# Patient Record
Sex: MALE | Race: WHITE | NOT HISPANIC OR LATINO | Employment: FULL TIME | URBAN - NONMETROPOLITAN AREA
[De-identification: names, ages, dates, MRNs, and addresses within clinical notes are randomized per-mention and may not be internally consistent; named-entity substitution may affect disease eponyms.]

---

## 2023-03-22 ENCOUNTER — APPOINTMENT (EMERGENCY)
Dept: CT IMAGING | Facility: HOSPITAL | Age: 38
End: 2023-03-22

## 2023-03-22 ENCOUNTER — HOSPITAL ENCOUNTER (EMERGENCY)
Facility: HOSPITAL | Age: 38
Discharge: HOME | End: 2023-03-22
Attending: EMERGENCY MEDICINE

## 2023-03-22 VITALS
BODY MASS INDEX: 30.73 KG/M2 | OXYGEN SATURATION: 98 % | TEMPERATURE: 97.9 F | HEART RATE: 88 BPM | WEIGHT: 195.77 LBS | DIASTOLIC BLOOD PRESSURE: 82 MMHG | RESPIRATION RATE: 17 BRPM | SYSTOLIC BLOOD PRESSURE: 132 MMHG | HEIGHT: 67 IN

## 2023-03-22 DIAGNOSIS — N13.30 HYDRONEPHROSIS, LEFT: Primary | ICD-10-CM

## 2023-03-22 DIAGNOSIS — R10.9 LEFT FLANK PAIN: Primary | ICD-10-CM

## 2023-03-22 DIAGNOSIS — N20.1 URETEROLITHIASIS: ICD-10-CM

## 2023-03-22 DIAGNOSIS — N20.1 URETERAL CALCULUS, LEFT: ICD-10-CM

## 2023-03-22 LAB
ANION GAP SERPL CALCULATED.3IONS-SCNC: 7 MMOL/L (ref 4–13)
BACTERIA UR QL AUTO: NORMAL /HPF
BASOPHILS # BLD AUTO: 0.07 THOUSANDS/ÂΜL (ref 0–0.1)
BASOPHILS NFR BLD AUTO: 1 % (ref 0–1)
BILIRUB UR QL STRIP: NEGATIVE
BUN SERPL-MCNC: 14 MG/DL (ref 5–25)
CALCIUM SERPL-MCNC: 10 MG/DL (ref 8.4–10.2)
CHLORIDE SERPL-SCNC: 100 MMOL/L (ref 96–108)
CLARITY UR: CLEAR
CO2 SERPL-SCNC: 28 MMOL/L (ref 21–32)
COLOR UR: YELLOW
CREAT SERPL-MCNC: 1.4 MG/DL (ref 0.6–1.3)
EOSINOPHIL # BLD AUTO: 0.05 THOUSAND/ÂΜL (ref 0–0.61)
EOSINOPHIL NFR BLD AUTO: 0 % (ref 0–6)
ERYTHROCYTE [DISTWIDTH] IN BLOOD BY AUTOMATED COUNT: 12 % (ref 11.6–15.1)
GFR SERPL CREATININE-BSD FRML MDRD: 64 ML/MIN/1.73SQ M
GLUCOSE SERPL-MCNC: 99 MG/DL (ref 65–140)
GLUCOSE UR STRIP-MCNC: NEGATIVE MG/DL
HCT VFR BLD AUTO: 41.8 % (ref 36.5–49.3)
HGB BLD-MCNC: 14.4 G/DL (ref 12–17)
HGB UR QL STRIP.AUTO: ABNORMAL
IMM GRANULOCYTES # BLD AUTO: 0.08 THOUSAND/UL (ref 0–0.2)
IMM GRANULOCYTES NFR BLD AUTO: 1 % (ref 0–2)
KETONES UR STRIP-MCNC: NEGATIVE MG/DL
LEUKOCYTE ESTERASE UR QL STRIP: NEGATIVE
LYMPHOCYTES # BLD AUTO: 1.92 THOUSANDS/ÂΜL (ref 0.6–4.47)
LYMPHOCYTES NFR BLD AUTO: 16 % (ref 14–44)
MCH RBC QN AUTO: 28.9 PG (ref 26.8–34.3)
MCHC RBC AUTO-ENTMCNC: 34.4 G/DL (ref 31.4–37.4)
MCV RBC AUTO: 84 FL (ref 82–98)
MONOCYTES # BLD AUTO: 1.05 THOUSAND/ÂΜL (ref 0.17–1.22)
MONOCYTES NFR BLD AUTO: 9 % (ref 4–12)
NEUTROPHILS # BLD AUTO: 8.99 THOUSANDS/ÂΜL (ref 1.85–7.62)
NEUTS SEG NFR BLD AUTO: 73 % (ref 43–75)
NITRITE UR QL STRIP: NEGATIVE
NON-SQ EPI CELLS URNS QL MICRO: NORMAL /HPF
NRBC BLD AUTO-RTO: 0 /100 WBCS
PH UR STRIP.AUTO: 6.5 [PH]
PLATELET # BLD AUTO: 287 THOUSANDS/UL (ref 149–390)
PMV BLD AUTO: 8.5 FL (ref 8.9–12.7)
POTASSIUM SERPL-SCNC: 4.2 MMOL/L (ref 3.5–5.3)
PROT UR STRIP-MCNC: NEGATIVE MG/DL
RBC # BLD AUTO: 4.98 MILLION/UL (ref 3.88–5.62)
RBC #/AREA URNS AUTO: NORMAL /HPF
SODIUM SERPL-SCNC: 135 MMOL/L (ref 135–147)
SP GR UR STRIP.AUTO: 1.01 (ref 1–1.03)
UROBILINOGEN UR QL STRIP.AUTO: 0.2 E.U./DL
WBC # BLD AUTO: 12.16 THOUSAND/UL (ref 4.31–10.16)
WBC #/AREA URNS AUTO: NORMAL /HPF

## 2023-03-22 RX ORDER — ESOMEPRAZOLE MAGNESIUM 40 MG/1
1 CAPSULE, DELAYED RELEASE ORAL
COMMUNITY
Start: 2022-10-04

## 2023-03-22 RX ORDER — ATOGEPANT 60 MG/1
60 TABLET ORAL DAILY
COMMUNITY
Start: 2022-10-03

## 2023-03-22 RX ORDER — AMITRIPTYLINE HYDROCHLORIDE 25 MG/1
25 TABLET, FILM COATED ORAL DAILY
COMMUNITY
Start: 2023-03-13

## 2023-03-22 RX ORDER — CEPHALEXIN 500 MG/1
500 CAPSULE ORAL EVERY 8 HOURS SCHEDULED
Qty: 21 CAPSULE | Refills: 0 | Status: SHIPPED | OUTPATIENT
Start: 2023-03-22 | End: 2023-03-29

## 2023-03-22 RX ORDER — OXYCODONE HYDROCHLORIDE AND ACETAMINOPHEN 5; 325 MG/1; MG/1
1 TABLET ORAL EVERY 8 HOURS PRN
Qty: 15 TABLET | Refills: 0 | Status: SHIPPED | OUTPATIENT
Start: 2023-03-22

## 2023-03-22 RX ORDER — ONDANSETRON 2 MG/ML
4 INJECTION INTRAMUSCULAR; INTRAVENOUS ONCE
Status: COMPLETED | OUTPATIENT
Start: 2023-03-22 | End: 2023-03-22

## 2023-03-22 RX ORDER — LEVOCETIRIZINE DIHYDROCHLORIDE 5 MG/1
1 TABLET, FILM COATED ORAL EVERY EVENING
COMMUNITY
Start: 2022-12-09

## 2023-03-22 RX ORDER — ONDANSETRON 4 MG/1
4 TABLET, ORALLY DISINTEGRATING ORAL EVERY 8 HOURS PRN
Qty: 20 TABLET | Refills: 0 | Status: SHIPPED | OUTPATIENT
Start: 2023-03-22

## 2023-03-22 RX ORDER — NAPROXEN 500 MG/1
500 TABLET ORAL 2 TIMES DAILY PRN
Qty: 30 TABLET | Refills: 0 | Status: SHIPPED | OUTPATIENT
Start: 2023-03-22

## 2023-03-22 RX ORDER — ATORVASTATIN CALCIUM 20 MG/1
1 TABLET, FILM COATED ORAL DAILY
COMMUNITY
Start: 2022-12-09

## 2023-03-22 RX ORDER — KETOROLAC TROMETHAMINE 30 MG/ML
15 INJECTION, SOLUTION INTRAMUSCULAR; INTRAVENOUS ONCE
Status: COMPLETED | OUTPATIENT
Start: 2023-03-22 | End: 2023-03-22

## 2023-03-22 RX ADMIN — KETOROLAC TROMETHAMINE 15 MG: 30 INJECTION, SOLUTION INTRAMUSCULAR at 09:44

## 2023-03-22 RX ADMIN — ONDANSETRON 4 MG: 2 INJECTION INTRAMUSCULAR; INTRAVENOUS at 09:43

## 2023-03-22 NOTE — DISCHARGE INSTRUCTIONS
Take medications as prescribed  Return with any worsening symptoms  You will be contacted by urology to schedule further treatment  You may call them if you do not hear from them by this afternoon  Thank you for choosing the emergency department at [facility]  We appreciated the opportunity and privilege to address your healthcare needs  We remain available to you should you require additional evaluation or assistance  We value your feedback and would appreciate the opportunity to address anything you identified as an opportunity to improve or where we excelled  If there are colleagues who deserve special recognition, please let us know! We hope you are feeling better soon! Please also note that sometimes there are subtle abnormalities in your lab values that you may observe when you access your record online  These are frequently not worrisome and if they are of concern we will have discussed them with you  However, we always encourage that you discuss any concerns you may have or observe on your record with your primary care provider  Please also be aware that voice transcription will occasionally recognize words or grammar differently than what was spoken

## 2023-03-22 NOTE — ED PROVIDER NOTES
History  Chief Complaint   Patient presents with   • Flank Pain     Pt c/o left flank pain radiating to abdomen w/nausea, vomiting and testicle pain starting last night  Hx kidney stones  Took motrin with some relief  Denies travel/sob/fevers/cough/d     73-year-old male with a history of kidney stones presenting to the emergency room with chief complaint of left-sided flank pain with radiation to his left lower quadrant and radiation to his testicle  Began last evening  Initially improved and then became worse again  Has become associate with nausea vomiting  At this point patient reports that he is feeling somewhat improved again  No fevers or chills  No dysuria  No hematuria  History provided by:  Patient and friend (Girlfriend provides portions of history )  Flank Pain  Pain location:  L flank  Pain quality: sharp and shooting    Pain radiates to:  LLQ and scrotum  Pain severity:  Moderate  Onset quality:  Sudden  Timing:  Intermittent  Progression:  Waxing and waning  Context: awakening from sleep    Relieved by:  None tried  Worsened by:  Palpation  Ineffective treatments:  None tried  Associated symptoms: nausea and vomiting    Associated symptoms: no chest pain, no dysuria, no fever, no hematuria and no shortness of breath        Prior to Admission Medications   Prescriptions Last Dose Informant Patient Reported? Taking?    Atogepant (Qulipta) 60 MG TABS 3/22/2023  Yes Yes   Sig: Take 60 mg by mouth daily   amitriptyline (ELAVIL) 25 mg tablet 3/21/2023  Yes Yes   Sig: Take 25 mg by mouth daily   atorvastatin (LIPITOR) 20 mg tablet 3/21/2023  Yes Yes   Sig: Take 1 tablet by mouth daily   esomeprazole (NexIUM) 40 MG capsule 3/22/2023  Yes Yes   Sig: Take 1 capsule by mouth daily before breakfast   levocetirizine (XYZAL) 5 MG tablet 3/21/2023  Yes Yes   Sig: Take 1 tablet by mouth every evening   propranolol (INNOPRAN XL) 120 MG 24 hr capsule 3/21/2023  Yes Yes   Sig: Take 120 mg by mouth daily Facility-Administered Medications: None       History reviewed  No pertinent past medical history  History reviewed  No pertinent surgical history  History reviewed  No pertinent family history  I have reviewed and agree with the history as documented  E-Cigarette/Vaping   • E-Cigarette Use Never User      E-Cigarette/Vaping Substances     Social History     Tobacco Use   • Smoking status: Never     Passive exposure: Never   • Smokeless tobacco: Never   Vaping Use   • Vaping Use: Never used   Substance Use Topics   • Alcohol use: Never   • Drug use: Never       Review of Systems   Constitutional: Negative  Negative for fever  HENT: Negative  Respiratory: Negative  Negative for choking, chest tightness, shortness of breath and wheezing  Cardiovascular: Negative  Negative for chest pain and palpitations  Gastrointestinal: Positive for nausea and vomiting  Genitourinary: Positive for difficulty urinating, flank pain and testicular pain  Negative for dysuria, frequency, hematuria, penile discharge, penile pain, penile swelling, scrotal swelling and urgency  All other systems reviewed and are negative  Physical Exam  Physical Exam  Vitals and nursing note reviewed  Constitutional:       General: He is in acute distress  Appearance: Normal appearance  He is well-developed and normal weight  He is not ill-appearing or toxic-appearing  HENT:      Head: Normocephalic and atraumatic  Hair is normal       Jaw: No pain on movement  Right Ear: External ear normal       Left Ear: External ear normal       Nose: Nose normal  No congestion  Mouth/Throat:      Mouth: Mucous membranes are moist    Eyes:      General: Lids are normal  No scleral icterus  Extraocular Movements: Extraocular movements intact  Conjunctiva/sclera: Conjunctivae normal       Pupils: Pupils are equal, round, and reactive to light     Cardiovascular:      Rate and Rhythm: Normal rate and regular rhythm  Heart sounds: Normal heart sounds  No murmur heard  Pulmonary:      Effort: Pulmonary effort is normal  No respiratory distress  Breath sounds: Normal breath sounds  No decreased breath sounds, wheezing, rhonchi or rales  Abdominal:      General: Abdomen is flat  There is no distension  Palpations: Abdomen is soft  Abdomen is not rigid  Tenderness: There is no abdominal tenderness  There is left CVA tenderness  There is no guarding or rebound  Musculoskeletal:         General: Normal range of motion  Cervical back: Normal range of motion and neck supple  Skin:     General: Skin is warm and dry  Coloration: Skin is not pale  Neurological:      General: No focal deficit present  Mental Status: He is alert and oriented to person, place, and time  Mental status is at baseline  Psychiatric:         Attention and Perception: Attention normal          Mood and Affect: Mood normal          Speech: Speech normal          Behavior: Behavior normal          Thought Content:  Thought content normal          Judgment: Judgment normal          Vital Signs  ED Triage Vitals [03/22/23 0831]   Temperature Pulse Respirations Blood Pressure SpO2   97 9 °F (36 6 °C) 88 17 132/82 98 %      Temp Source Heart Rate Source Patient Position - Orthostatic VS BP Location FiO2 (%)   Temporal Monitor Lying Right arm --      Pain Score       4           Vitals:    03/22/23 0831   BP: 132/82   Pulse: 88   Patient Position - Orthostatic VS: Lying         Visual Acuity      ED Medications  Medications   ondansetron (ZOFRAN) injection 4 mg (4 mg Intravenous Given 3/22/23 0943)   ketorolac (TORADOL) injection 15 mg (15 mg Intravenous Given 3/22/23 0944)       Diagnostic Studies  Results Reviewed     Procedure Component Value Units Date/Time    Basic metabolic panel [[id number]]  (Abnormal) Collected: 03/22/23 0945    Lab Status: Final result Specimen: Blood from Arm, Left Updated: 03/22/23 1012 Sodium 135 mmol/L      Potassium 4 2 mmol/L      Chloride 100 mmol/L      CO2 28 mmol/L      ANION GAP 7 mmol/L      BUN 14 mg/dL      Creatinine 1 40 mg/dL      Glucose 99 mg/dL      Calcium 10 0 mg/dL      eGFR 64 ml/min/1 73sq m     Narrative:      [place] guidelines for Chronic Kidney Disease (CKD):   •  Stage 1 with normal or high GFR (GFR > 90 mL/min/1 73 square meters)  •  Stage 2 Mild CKD (GFR = 60-89 mL/min/1 73 square meters)  •  Stage 3A Moderate CKD (GFR = 45-59 mL/min/1 73 square meters)  •  Stage 3B Moderate CKD (GFR = 30-44 mL/min/1 73 square meters)  •  Stage 4 Severe CKD (GFR = 15-29 mL/min/1 73 square meters)  •  Stage 5 End Stage CKD (GFR <15 mL/min/1 73 square meters)  Note: GFR calculation is accurate only with a steady state creatinine    Urine Microscopic [[id number]]  (Normal) Collected: 03/22/23 0917    Lab Status: Final result Specimen: Urine Updated: 03/22/23 0954     RBC, UA 0-1 /hpf      WBC, UA None Seen /hpf      Epithelial Cells None Seen /hpf      Bacteria, UA None Seen /hpf     CBC and differential [[id number]]  (Abnormal) Collected: 03/22/23 0945    Lab Status: Final result Specimen: Blood from Arm, Left Updated: 03/22/23 0950     WBC 12 16 Thousand/uL      RBC 4 98 Million/uL      Hemoglobin 14 4 g/dL      Hematocrit 41 8 %      MCV 84 fL      MCH 28 9 pg      MCHC 34 4 g/dL      RDW 12 0 %      MPV 8 5 fL      Platelets 936 Thousands/uL      nRBC 0 /100 WBCs      Neutrophils Relative 73 %      Immat GRANS % 1 %      Lymphocytes Relative 16 %      Monocytes Relative 9 %      Eosinophils Relative 0 %      Basophils Relative 1 %      Neutrophils Absolute 8 99 Thousands/µL      Immature Grans Absolute 0 08 Thousand/uL      Lymphocytes Absolute 1 92 Thousands/µL      Monocytes Absolute 1 05 Thousand/µL      Eosinophils Absolute 0 05 Thousand/µL      Basophils Absolute 0 07 Thousands/µL     UA w Reflex to Microscopic w Reflex to Culture [[id number]]  (Abnormal) Collected: 03/22/23 0917    Lab Status: Final result Specimen: Urine Updated: 03/22/23 0929     Color, UA Yellow     Clarity, UA Clear     Specific Gravity, UA 1 010     pH, UA 6 5     Leukocytes, UA Negative     Nitrite, UA Negative     Protein, UA Negative mg/dl      Glucose, UA Negative mg/dl      Ketones, UA Negative mg/dl      Urobilinogen, UA 0 2 E U /dl      Bilirubin, UA Negative     Occult Blood, UA Trace-Intact                 CT renal stone study abdomen pelvis wo contrast   Final Result by Clara Romo MD (03/22 0946)      Moderate left-sided hydroureteronephrosis secondary to a 7 mm calculus in the mid left ureter  Left-sided perinephric and periureteral stranding  Workstation performed: [id number]                    Procedures  Procedures         ED Course  ED Course as of 03/22/23 1259   Wed Mar 22, 2023   0953 Blood, UA(!): Trace-Intact   5494 CT shows a left-sided 7 mm mid ureter stone  Perinephric stranding  Will discuss findings with urology  1029 Creatinine(!): 1 40                                             Medical Decision Making  Patient presented to the emergency department and a MSE was performed  The patient was evaluated for complaint related to acute abdominal pain in a male patient  Patient is potentially at risk for, but not limited to, acute gastritis, pancreatitis, biliary colic, cholecystitis, urinary infection, renal cyst, kidney stone, diverticulitis, diverticulosis, ulcerative colitis, Crohn's disease, enteritis, viral gastroenteritis, constipation, or other disease process unrelated to the abdomen which may cause this symptomatology is also considered  Several of these diagnoses have been evaluated and ruled out by history and physical   As needed, patient will be further evaluated with laboratory and imaging studies  Higher level diagnostics, such as CT imaging or ultrasound, may also be required    Please see work-up portion of the note for further evaluation of patient's risk  Socioeconomic factors were also considered as part of the decision-making process  Unless otherwise stated in the chart or patient is admitted as elsewhere documented, any previously prescribed medications will be maintained  Left flank pain: complicated acute illness or injury with systemic symptoms  Ureterolithiasis: complicated acute illness or injury with systemic symptoms  Amount and/or Complexity of Data Reviewed  Independent Historian: friend  Labs: ordered  Decision-making details documented in ED Course  Radiology: ordered  Risk  Prescription drug management  Decision regarding hospitalization  Risk Details: Discussed case with urology  Patient to be seen by them tomorrow or Friday for planned outpatient procedure in the operating room for stone retrieval   Discussed with patient and his girlfriend  They verbalized understanding of same  Patient was prescribed pain medications and antiemetics  Also was prescribed antibiotic at the recommendation of urology for preprocedural prophylaxis  Patient discharged in stable condition  Return to the emergency room with any worsening  Disposition  Final diagnoses:   Left flank pain   Ureterolithiasis     Time reflects when diagnosis was documented in both MDM as applicable and the Disposition within this note     Time User Action Codes Description Comment    3/22/2023 10:10 AM Gilberto Cha Add [R10 9] Left flank pain     3/22/2023 10:10 AM Gilberto Cha Add [N20 1] Ureterolithiasis       ED Disposition     ED Disposition   Discharge    Condition   Stable    Date/Time   Wed Mar 22, 2023 11:23 AM    Comment   Rangel Book discharge to home/self care                 Follow-up Information     Follow up With Specialties Details Why Paige Rosenberg MD Urology In 1 day  [address]  [phone]            Discharge Medication List as of 3/22/2023 11:23 AM      START taking these medications    Details   cephalexin (KEFLEX) 500 mg capsule Take 1 capsule (500 mg total) by mouth every 8 (eight) hours for 7 days, Starting Wed 3/22/2023, Until Wed 3/29/2023, Normal      naproxen (NAPROSYN) 500 mg tablet Take 1 tablet (500 mg total) by mouth 2 (two) times a day as needed for mild pain or moderate pain, Starting Wed 3/22/2023, Normal      ondansetron (ZOFRAN-ODT) 4 mg disintegrating tablet Take 1 tablet (4 mg total) by mouth every 8 (eight) hours as needed for nausea or vomiting, Starting Wed 3/22/2023, Normal      oxyCODONE-acetaminophen (Percocet) 5-325 mg per tablet Take 1 tablet by mouth every 8 (eight) hours as needed for severe pain for up to 15 doses Max Daily Amount: 3 tablets, Starting Wed 3/22/2023, Normal         CONTINUE these medications which have NOT CHANGED    Details   amitriptyline (ELAVIL) 25 mg tablet Take 25 mg by mouth daily, Starting Mon 3/13/2023, Historical Med      Atogepant (Qulipta) 60 MG TABS Take 60 mg by mouth daily, Starting Mon 10/3/2022, Historical Med      atorvastatin (LIPITOR) 20 mg tablet Take 1 tablet by mouth daily, Starting Fri 12/9/2022, Historical Med      esomeprazole (NexIUM) 40 MG capsule Take 1 capsule by mouth daily before breakfast, Starting Tue 10/4/2022, Historical Med      levocetirizine (XYZAL) 5 MG tablet Take 1 tablet by mouth every evening, Starting Fri 12/9/2022, Historical Med      propranolol (INNOPRAN XL) 120 MG 24 hr capsule Take 120 mg by mouth daily, Starting Wed 12/7/2022, Historical Med                 PDMP Review     None          ED Provider  Electronically Signed by           Silver Green DO  03/22/23 6772

## 2023-03-23 ENCOUNTER — ANESTHESIA EVENT (OUTPATIENT)
Dept: PERIOP | Facility: HOSPITAL | Age: 38
End: 2023-03-23

## 2023-03-23 ENCOUNTER — APPOINTMENT (OUTPATIENT)
Dept: RADIOLOGY | Facility: HOSPITAL | Age: 38
End: 2023-03-23

## 2023-03-23 ENCOUNTER — TELEPHONE (OUTPATIENT)
Dept: CARDIOLOGY CLINIC | Facility: CLINIC | Age: 38
End: 2023-03-23

## 2023-03-23 ENCOUNTER — ANESTHESIA (OUTPATIENT)
Dept: PERIOP | Facility: HOSPITAL | Age: 38
End: 2023-03-23

## 2023-03-23 ENCOUNTER — HOSPITAL ENCOUNTER (OUTPATIENT)
Facility: HOSPITAL | Age: 38
Setting detail: OUTPATIENT SURGERY
Discharge: HOME | End: 2023-03-23
Attending: UROLOGY | Admitting: UROLOGY

## 2023-03-23 VITALS
RESPIRATION RATE: 20 BRPM | HEART RATE: 78 BPM | DIASTOLIC BLOOD PRESSURE: 58 MMHG | HEIGHT: 67 IN | BODY MASS INDEX: 30.61 KG/M2 | OXYGEN SATURATION: 98 % | TEMPERATURE: 97.8 F | SYSTOLIC BLOOD PRESSURE: 144 MMHG | WEIGHT: 195 LBS

## 2023-03-23 DIAGNOSIS — N20.1 URETERAL STONE: Primary | ICD-10-CM

## 2023-03-23 DIAGNOSIS — N13.2 URETERAL STONE WITH HYDRONEPHROSIS: ICD-10-CM

## 2023-03-23 DEVICE — INLAY OPTIMA URETERAL STENT W/O GUIDEWIRE
Type: IMPLANTABLE DEVICE | Site: URETER | Status: FUNCTIONAL
Brand: BARD® INLAY OPTIMA® URETERAL STENT

## 2023-03-23 RX ORDER — ONDANSETRON 2 MG/ML
4 INJECTION INTRAMUSCULAR; INTRAVENOUS ONCE
Status: COMPLETED | OUTPATIENT
Start: 2023-03-23 | End: 2023-03-23

## 2023-03-23 RX ORDER — MIDAZOLAM HYDROCHLORIDE 2 MG/2ML
INJECTION, SOLUTION INTRAMUSCULAR; INTRAVENOUS AS NEEDED
Status: DISCONTINUED | OUTPATIENT
Start: 2023-03-23 | End: 2023-03-23

## 2023-03-23 RX ORDER — OXYBUTYNIN CHLORIDE 10 MG/1
10 TABLET, EXTENDED RELEASE ORAL
Qty: 20 TABLET | Refills: 0 | Status: SHIPPED | OUTPATIENT
Start: 2023-03-23

## 2023-03-23 RX ORDER — DEXMEDETOMIDINE HYDROCHLORIDE 100 UG/ML
INJECTION, SOLUTION INTRAVENOUS AS NEEDED
Status: DISCONTINUED | OUTPATIENT
Start: 2023-03-23 | End: 2023-03-23

## 2023-03-23 RX ORDER — LIDOCAINE HYDROCHLORIDE 20 MG/ML
JELLY TOPICAL AS NEEDED
Status: DISCONTINUED | OUTPATIENT
Start: 2023-03-23 | End: 2023-03-23 | Stop reason: HOSPADM

## 2023-03-23 RX ORDER — PROPOFOL 10 MG/ML
INJECTION, EMULSION INTRAVENOUS CONTINUOUS PRN
Status: DISCONTINUED | OUTPATIENT
Start: 2023-03-23 | End: 2023-03-23

## 2023-03-23 RX ORDER — MAGNESIUM HYDROXIDE 1200 MG/15ML
LIQUID ORAL AS NEEDED
Status: DISCONTINUED | OUTPATIENT
Start: 2023-03-23 | End: 2023-03-23 | Stop reason: HOSPADM

## 2023-03-23 RX ORDER — PROPOFOL 10 MG/ML
INJECTION, EMULSION INTRAVENOUS AS NEEDED
Status: DISCONTINUED | OUTPATIENT
Start: 2023-03-23 | End: 2023-03-23

## 2023-03-23 RX ORDER — DEXAMETHASONE SODIUM PHOSPHATE 10 MG/ML
INJECTION, SOLUTION INTRAMUSCULAR; INTRAVENOUS AS NEEDED
Status: DISCONTINUED | OUTPATIENT
Start: 2023-03-23 | End: 2023-03-23

## 2023-03-23 RX ORDER — ONDANSETRON 2 MG/ML
4 INJECTION INTRAMUSCULAR; INTRAVENOUS ONCE AS NEEDED
Status: DISCONTINUED | OUTPATIENT
Start: 2023-03-23 | End: 2023-03-23 | Stop reason: HOSPADM

## 2023-03-23 RX ORDER — ONDANSETRON 2 MG/ML
INJECTION INTRAMUSCULAR; INTRAVENOUS AS NEEDED
Status: DISCONTINUED | OUTPATIENT
Start: 2023-03-23 | End: 2023-03-23

## 2023-03-23 RX ORDER — PHENYLEPHRINE HCL IN 0.9% NACL 1 MG/10 ML
SYRINGE (ML) INTRAVENOUS AS NEEDED
Status: DISCONTINUED | OUTPATIENT
Start: 2023-03-23 | End: 2023-03-23

## 2023-03-23 RX ORDER — DIPHENHYDRAMINE HYDROCHLORIDE 50 MG/ML
12.5 INJECTION INTRAMUSCULAR; INTRAVENOUS ONCE AS NEEDED
Status: DISCONTINUED | OUTPATIENT
Start: 2023-03-23 | End: 2023-03-23 | Stop reason: HOSPADM

## 2023-03-23 RX ORDER — PHENAZOPYRIDINE HYDROCHLORIDE 100 MG/1
200 TABLET, FILM COATED ORAL ONCE AS NEEDED
Status: COMPLETED | OUTPATIENT
Start: 2023-03-23 | End: 2023-03-23

## 2023-03-23 RX ORDER — OXYBUTYNIN CHLORIDE 5 MG/1
5 TABLET ORAL ONCE AS NEEDED
Status: COMPLETED | OUTPATIENT
Start: 2023-03-23 | End: 2023-03-23

## 2023-03-23 RX ORDER — CEFAZOLIN SODIUM 2 G/50ML
2000 SOLUTION INTRAVENOUS ONCE
Status: COMPLETED | OUTPATIENT
Start: 2023-03-23 | End: 2023-03-23

## 2023-03-23 RX ORDER — FENTANYL CITRATE 50 UG/ML
INJECTION, SOLUTION INTRAMUSCULAR; INTRAVENOUS AS NEEDED
Status: DISCONTINUED | OUTPATIENT
Start: 2023-03-23 | End: 2023-03-23

## 2023-03-23 RX ORDER — SUCCINYLCHOLINE/SOD CL,ISO/PF 100 MG/5ML
SYRINGE (ML) INTRAVENOUS AS NEEDED
Status: DISCONTINUED | OUTPATIENT
Start: 2023-03-23 | End: 2023-03-23

## 2023-03-23 RX ORDER — PHENAZOPYRIDINE HYDROCHLORIDE 200 MG/1
200 TABLET, FILM COATED ORAL
Qty: 10 TABLET | Refills: 0 | Status: SHIPPED | OUTPATIENT
Start: 2023-03-23

## 2023-03-23 RX ORDER — EPHEDRINE SULFATE 50 MG/ML
INJECTION INTRAVENOUS AS NEEDED
Status: DISCONTINUED | OUTPATIENT
Start: 2023-03-23 | End: 2023-03-23

## 2023-03-23 RX ORDER — FENTANYL CITRATE/PF 50 MCG/ML
25 SYRINGE (ML) INJECTION
Status: DISCONTINUED | OUTPATIENT
Start: 2023-03-23 | End: 2023-03-23 | Stop reason: HOSPADM

## 2023-03-23 RX ORDER — SODIUM CHLORIDE, SODIUM LACTATE, POTASSIUM CHLORIDE, CALCIUM CHLORIDE 600; 310; 30; 20 MG/100ML; MG/100ML; MG/100ML; MG/100ML
INJECTION, SOLUTION INTRAVENOUS CONTINUOUS PRN
Status: DISCONTINUED | OUTPATIENT
Start: 2023-03-23 | End: 2023-03-23

## 2023-03-23 RX ADMIN — PROPOFOL 130 MCG/KG/MIN: 10 INJECTION, EMULSION INTRAVENOUS at 15:07

## 2023-03-23 RX ADMIN — DEXAMETHASONE SODIUM PHOSPHATE 10 MG: 10 INJECTION, SOLUTION INTRAMUSCULAR; INTRAVENOUS at 15:20

## 2023-03-23 RX ADMIN — CEFAZOLIN SODIUM 2000 MG: 2 SOLUTION INTRAVENOUS at 15:00

## 2023-03-23 RX ADMIN — PHENAZOPYRIDINE 200 MG: 100 TABLET ORAL at 16:49

## 2023-03-23 RX ADMIN — OXYBUTYNIN CHLORIDE 5 MG: 5 TABLET ORAL at 16:49

## 2023-03-23 RX ADMIN — FENTANYL CITRATE 100 MCG: 50 INJECTION INTRAMUSCULAR; INTRAVENOUS at 15:05

## 2023-03-23 RX ADMIN — ONDANSETRON 4 MG: 2 INJECTION INTRAMUSCULAR; INTRAVENOUS at 12:53

## 2023-03-23 RX ADMIN — PROPOFOL 200 MG: 10 INJECTION, EMULSION INTRAVENOUS at 15:06

## 2023-03-23 RX ADMIN — DEXMEDETOMIDINE HCL 12 MCG: 100 INJECTION INTRAVENOUS at 15:58

## 2023-03-23 RX ADMIN — SODIUM CHLORIDE, SODIUM LACTATE, POTASSIUM CHLORIDE, AND CALCIUM CHLORIDE: .6; .31; .03; .02 INJECTION, SOLUTION INTRAVENOUS at 15:00

## 2023-03-23 RX ADMIN — Medication 100 MG: at 15:07

## 2023-03-23 RX ADMIN — ONDANSETRON 4 MG: 2 INJECTION INTRAMUSCULAR; INTRAVENOUS at 15:02

## 2023-03-23 RX ADMIN — MIDAZOLAM 2 MG: 1 INJECTION INTRAMUSCULAR; INTRAVENOUS at 14:59

## 2023-03-23 NOTE — H&P
Chief complaint: Left flank pain left ureteral calculus  HPI: Patient is 30years old and has a mid left ureteral calculus about 7 to 8 mm causing significant obstruction and hydronephrosis  He has had flank pain for about 2 days  He was in the emergency room yesterday  He continues to have pain and nausea  After reviewing treatment alternatives he is elected to proceed with left ureteroscopy laser lithotripsy and stent placement  I spoke with both he and his father regarding the above  I also reviewed his CT scan and other information with both of them  I showed them the images  He has had no fever or gross hematuria  He had a lithotripsy-[facility] with Dr Sybil Topete back in 2018 for a right mid ureteral stone  Past medical history significant for migraines and stone disease as noted above  He also has a history of high cholesterol and gastroesophageal reflux  No known drug allergies  No family history of stone disease  He is a non-smoker  No history of alcohol abuse  Review of systems positive for left flank pain and nausea  No history of shortness of breath bleeding disorder or prosthesis    Physical exam the patient's in mild to moderate distress related to nausea and  flank pain  His temperature is 98 3  His heart rate is 64 and regular  His respiratory rate is 18  Blood pressure is 118/84  He presently has a pain score of 3    Chest is clear bilaterally    Cardiovascular regular rate and rhythm  Abdomen is soft and he has left flank tenderness  CT scan and laboratory studies reviewed  Assessment: 7 to 8 mm mid left ureteral calculus with significant obstruction and continued pain and nausea  His creatinine was also mildly elevated  Plan: I spoke with he and his father at length and reviewed the information with them  Options were reviewed and they would like to proceed with left ureteroscopy laser lithotripsy basket extraction of fragments and stent placement    I explained to them that it is possible that we may not be able to access the stone at the initial attempt if there is difficulty with access or visualization  They understand the need for staged procedure placing a stent first and then moving ahead with the stone fragmentation procedure thereafter  They also  understand the option of ESWL with or without a stent but potential issues with availability of that unit  In addition we discussed the possible need for nephrostomy tube if unable to place a stent  We also discussed medical expulsive therapy  Informed consent was obtained  Danny Ordaz no

## 2023-03-23 NOTE — OP NOTE
OPERATIVE REPORT  PATIENT NAME: Drea Costa    :  1985  MRN: [id number]  Pt Location: [facility] OR ROOM 02    SURGERY DATE: 3/23/2023    Surgeon(s) and Role:     * Tameka Collier MD - Primary    Preop Diagnosis:  Ureteral stone with hydronephrosis [N13 2]    Post-Op Diagnosis Codes:     * Ureteral stone with hydronephrosis [N13 2]    Procedure(s):  CYSTOSCOPY URETEROSCOPY WITH LITHOTRIPSY HOLMIUM LASER  RETROGRADE PYELOGRAM AND INSERTION STENT URETERAL    Specimen(s):  * No specimens in log *    Estimated Blood Loss:   Minimal    Drains:  Ureteral Internal Stent 6 Fr  (Active)   Number of days: 0       Anesthesia Type:   General    Operative Indications:  Ureteral stone with hydronephrosis [N13 2]  Patient has a mid left ureteral stone with persistent obstruction and nausea vomiting and left flank pain    Operative Findings:  Obstructing 7mm  mid left ureteral stone  Normal bladder and prostate  Normal urethra  Complications:   None    Procedure and Technique:  Patient was brought to the operating room identified and a timeout was satisfactory  He was placed in a supine position  Patient underwent his anesthetic by the anesthesia department  This was endotracheal intubation  The patient has a history of significant reflux and has been nauseated and vomiting during the day today  The patient was placed in a lithotomy position the penis was prepped and draped in usual sterile fashion using Betadine  Cystourethroscopy was then carried out after instillation of 2% lidocaine jelly to the urethra  The urethra was normal the prostate is nonobstructing  The bladder is normal   The right and left ureteral orifice are normal   There is no E flux from the left ureteral orifice  Fluoroscopy was performed  I am not able to identify distinct calcification and this is likely due to the fact that the stone on CT was overlying the upper pelvic brim      I then advanced a 035 hybrid Glidewire up the left ureter and was able to advance this beyond the level of the stone  There was slight hang up of the guidewire at the level of the stone at the location I just noted above  The guidewire was confirmed to be in good position fluoroscopically  The cystoscope was removed  I then advanced the Olympus semirigid ureteroscope up the left ureter under direct vision  The stone was identified in the region of the left mid ureter at the upper portion of the pelvic brim  There were some inflammatory changes noted at the location of the stone  Good visualization of the stone was achieved  I then performed holmium laser lithotripsy using the 400 µm laser fiber under direct vision  The stone was fragmented into tiny pieces all less than 2 mm in size  The guidewire was in good position  I performed ureteroscopy more proximally and no additional fragments were noted  Pullout ureteroscopy was satisfactory  All tiny fragments were of a passable size  There was no significant bleeding noted  There was no evidence of ureteral injury  The ureteroscope was removed in its entirety  The laser fiber was noted to be intact  The guidewire remained in good position  This was confirmed fluoroscopically  I then back fed the ureteroscope over the guidewire and placed a 6 [place] by 26 cm [place] inlay double-J ureteral stent with a short string tail residing within the bladder  We plan to leave the stent in place for about a week to 10 days  The bladder was drained and all instruments removed  Final imaging confirmed satisfactory stent position  The patient's anesthetic was reversed and he was returned to the recovery room in satisfactory condition  Findings were discussed with his father at the end of the procedure  He will be straining his urine post procedure  He has an antibiotic at home which she will continue  We also will be calling in an antispasmodic such as oxybutynin    We plan to see him back in 7 to 10 days for flexible cystoscopy and removal of stent in the office     I was present for the entire procedure    Patient Disposition:  PACU         SIGNATURE: Danny Reyes MD  DATE: March 23, 2023  TIME: 4:19 PM

## 2023-03-23 NOTE — ANESTHESIA PREPROCEDURE EVALUATION
Procedure:  CYSTOSCOPY URETEROSCOPY WITH LITHOTRIPSY HOLMIUM LASER, RETROGRADE PYELOGRAM AND INSERTION STENT URETERAL (Ureter)    Relevant Problems   ANESTHESIA (within normal limits)      CARDIO   (+) High cholesterol   (+) Migraines      ENDO (within normal limits)      GI/HEPATIC   (+) Gastroesophageal reflux disease      /RENAL   (+) Kidney stone      HEMATOLOGY (within normal limits)      NEURO/PSYCH   (+) Migraines      PULMONARY (within normal limits)      CTAP 3/22/2023: Moderate left-sided hydroureteronephrosis secondary to a 7 mm calculus in the mid left ureter  Left-sided perinephric and periureteral stranding        Lab Results   Component Value Date    WBC 12 16 (H) 03/22/2023    HGB 14 4 03/22/2023    HCT 41 8 03/22/2023    MCV 84 03/22/2023     03/22/2023     Lab Results   Component Value Date    SODIUM 135 03/22/2023    K 4 2 03/22/2023     03/22/2023    CO2 28 03/22/2023    BUN 14 03/22/2023    CREATININE 1 40 (H) 03/22/2023    GLUC 99 03/22/2023    CALCIUM 10 0 03/22/2023     No results found for: INR, PROTIME  No results found for: HGBA1C       Physical Exam    Airway    Mallampati score: II  TM Distance: >3 FB  Neck ROM: full     Dental   No notable dental hx     Cardiovascular  Cardiovascular exam normal    Pulmonary  Pulmonary exam normal     Other Findings        Anesthesia Plan  ASA Score- 2     Anesthesia Type- general with ASA Monitors  Additional Monitors:   Airway Plan: ETT  Comment: GETA with RSI given active nausea and vomitting    Discussed with patient plan for anesthesia, as well as risks/benefits, including likely chance of PONV and sore throat, as well as rare possibilities of dental/oropharyngeal/ocular injuries, aspiration, and severe/life-threatening surgical and anesthetic emergencies  Patient expressed understanding and agreement          Plan Factors-    Chart reviewed  Imaging results reviewed  Existing labs reviewed   Patient summary reviewed  Induction- intravenous and rapid sequence induction  Postoperative Plan-   Planned trial extubation    Informed Consent- Anesthetic plan and risks discussed with patient  I personally reviewed this patient with the CRNA  Discussed and agreed on the Anesthesia Plan with the CRNA  Bob Hurd

## 2023-03-23 NOTE — ANESTHESIA POSTPROCEDURE EVALUATION
Post-Op Assessment Note    CV Status:  Stable  Pain Score: 0    Pain management: adequate  Multimodal analgesia used between 6 hours prior to anesthesia start to PACU discharge    Mental Status:  Somnolent   Hydration Status:  Euvolemic and stable   PONV Controlled:  None   Airway Patency:  Patent   Two or more mitigation strategies used for obstructive sleep apnea   Post Op Vitals Reviewed: Yes      Staff: CRNA         No notable events documented      /66 (03/23/23 1615)    Temp (!) 97 1 °F (36 2 °C) (03/23/23 1615)    Pulse 83 (03/23/23 1615)   Resp 18 (03/23/23 1615)    SpO2 94 % (03/23/23 1615)

## 2023-04-03 PROBLEM — N20.1 URETERAL CALCULUS, LEFT: Status: ACTIVE | Noted: 2023-04-03

## 2023-04-25 ENCOUNTER — TELEPHONE (OUTPATIENT)
Dept: UROLOGY | Facility: CLINIC | Age: 38
End: 2023-04-25

## 2023-04-26 ENCOUNTER — HOSPITAL ENCOUNTER (OUTPATIENT)
Dept: RADIOLOGY | Facility: HOSPITAL | Age: 38
Discharge: HOME | End: 2023-04-26

## 2023-04-26 DIAGNOSIS — N20.1 URETERAL CALCULUS, LEFT: ICD-10-CM

## 2023-05-01 ENCOUNTER — TELEPHONE (OUTPATIENT)
Dept: CARDIOLOGY CLINIC | Facility: CLINIC | Age: 38
End: 2023-05-01

## 2023-06-06 ENCOUNTER — OFFICE VISIT (OUTPATIENT)
Dept: UROLOGY | Facility: CLINIC | Age: 38
End: 2023-06-06

## 2023-06-06 VITALS
HEART RATE: 71 BPM | HEIGHT: 67 IN | SYSTOLIC BLOOD PRESSURE: 120 MMHG | OXYGEN SATURATION: 99 % | DIASTOLIC BLOOD PRESSURE: 82 MMHG | BODY MASS INDEX: 29.63 KG/M2 | WEIGHT: 188.8 LBS | TEMPERATURE: 97.1 F

## 2023-06-06 DIAGNOSIS — N20.1 URETERAL CALCULUS, LEFT: Primary | ICD-10-CM

## 2023-06-06 NOTE — PROGRESS NOTES
UROLOGY PROGRESS NOTE         NAME: Leandra Voss  AGE: 41 y o  SEX: male  : 1985   MRN: [id number]    DATE: 2023  TIME: 2:25 PM    Assessment and Plan      Impression:   1  Ureteral calculus, left  -     POCT urine dip auto non-scope  -     XR abdomen 1 view kub; Future; Expected date: 2023         Plan: Patient doing very well since his procedure and stent removal   No troubles  Passed some little fragments  No blood in his urine no burning  UA today normal   Discussed stone prevention  He is going to be taking vitamin B6 and magnesium  He is already on this  We will see him back in 1 year with a KUB x-ray he will call if there are any issues  He declined a 24-hour urine collection at this point  Chief Complaint     Chief Complaint   Patient presents with   • Follow-up     History of Present Illness     HPI: Leandra Voss is a 41y o  year old male who presents with a prior history of left ureteral calculus treated with laser lithotripsy and stent placement  Stent subsequently removed  See notes  Follow-up KUB x-ray did not reveal any stones  Patient here today for follow-up                The following portions of the patient's history were reviewed and updated as appropriate: allergies, current medications, past family history, past medical history, past social history, past surgical history and problem list   Past Medical History:   Diagnosis Date   • High cholesterol    • Kidney stone    • Migraines      Past Surgical History:   Procedure Laterality Date   • COLONOSCOPY     • CYSTOSCOPY     • KIDNEY STONE SURGERY     • LITHOTRIPSY     • ID CYSTO/URETERO W/LITHOTRIPSY &INDWELL STENT INSRT N/A 2023    Procedure: CYSTOSCOPY URETEROSCOPY WITH LITHOTRIPSY HOLMIUM LASER, RETROGRADE PYELOGRAM AND INSERTION STENT URETERAL;  Surgeon: Nilsa Lipscomb MD;  Location:  MAIN OR;  Service: Urology     shoulder  Review of Systems     Const: Denies chills, fever and weight "loss   CV: Denies chest pain  Resp: Denies SOB  GI: Denies abdominal pain, nausea and vomiting  : Denies symptoms other than stated above  Musculo: Denies back pain  Objective   /82 (BP Location: Left arm, Patient Position: Sitting, Cuff Size: Adult)   Pulse 71   Temp (!) 97 1 °F (36 2 °C)   Ht 5' 7\" (1 702 m)   Wt 85 6 kg (188 lb 12 8 oz)   SpO2 99%   BMI 29 57 kg/m²     Physical Exam  Const: Appears healthy and well developed  No signs of acute distress present  Resp: Respirations are regular and unlabored  CV: Rate is regular  Rhythm is regular  Abdomen: Abdomen is soft, nontender, and nondistended  Kidneys are not palpable  : Not performed  Psych: Patient's attitude is cooperative   Mood is normal  Affect is normal     Procedure   Procedures     Current Medications     Current Outpatient Medications:   •  amitriptyline (ELAVIL) 25 mg tablet, Take 25 mg by mouth daily, Disp: , Rfl:   •  Atogepant (Qulipta) 60 MG TABS, Take 60 mg by mouth daily, Disp: , Rfl:   •  atorvastatin (LIPITOR) 20 mg tablet, Take 1 tablet by mouth daily, Disp: , Rfl:   •  esomeprazole (NexIUM) 40 MG capsule, Take 1 capsule by mouth daily before breakfast, Disp: , Rfl:   •  levocetirizine (XYZAL) 5 MG tablet, Take 1 tablet by mouth every evening, Disp: , Rfl:   •  Magnesium 100 MG CAPS, Take by mouth daily, Disp: , Rfl:   •  propranolol (INDERAL LA) 120 mg 24 hr capsule, Take 120 mg by mouth daily, Disp: , Rfl:   •  PYRIDOXINE HCL PO, Take by mouth daily, Disp: , Rfl:   •  naproxen (NAPROSYN) 500 mg tablet, Take 1 tablet (500 mg total) by mouth 2 (two) times a day as needed for mild pain or moderate pain (Patient not taking: Reported on 6/6/2023), Disp: 30 tablet, Rfl: 0  •  ondansetron (ZOFRAN-ODT) 4 mg disintegrating tablet, Take 1 tablet (4 mg total) by mouth every 8 (eight) hours as needed for nausea or vomiting (Patient not taking: Reported on 4/4/2023), Disp: 20 tablet, Rfl: 0  •  oxybutynin (DITROPAN-XL) 10 " MG 24 hr tablet, Take 1 tablet (10 mg total) by mouth daily at bedtime (Patient not taking: Reported on 6/6/2023), Disp: 20 tablet, Rfl: 0  •  oxyCODONE-acetaminophen (Percocet) 5-325 mg per tablet, Take 1 tablet by mouth every 8 (eight) hours as needed for severe pain for up to 15 doses Max Daily Amount: 3 tablets (Patient not taking: Reported on 6/6/2023), Disp: 15 tablet, Rfl: 0  •  phenazopyridine (PYRIDIUM) 200 mg tablet, Take 1 tablet (200 mg total) by mouth 3 (three) times a day with meals (Patient not taking: Reported on 4/4/2023), Disp: 10 tablet, Rfl: 0  •  propranolol (INNOPRAN XL) 120 MG 24 hr capsule, Take 120 mg by mouth daily (Patient not taking: Reported on 6/6/2023), Disp: , Rfl:   •  tamsulosin (FLOMAX) 0 4 mg, Take 1 capsule (0 4 mg total) by mouth daily with dinner (Patient not taking: Reported on 6/6/2023), Disp: 30 capsule, Rfl: 0        Sanjana Rinaldi MD

## (undated) DEVICE — 400 MICRON SINGLE-USE HOLMIUM FIBER ASSEMBLY WITH FLAT TIP: Brand: OPTILITE

## (undated) DEVICE — UROCATCH BAG

## (undated) DEVICE — SINGLE PORT MANIFOLD: Brand: NEPTUNE 2

## (undated) DEVICE — CATH URETERAL 5FR X 70 CM FLEX TIP POLYUR BARD

## (undated) DEVICE — BASIC SINGLE BASIN 2-LF: Brand: MEDLINE INDUSTRIES, INC.

## (undated) DEVICE — GAUZE SPONGES,16 PLY: Brand: CURITY

## (undated) DEVICE — GLOVE SRG BIOGEL 7

## (undated) DEVICE — ENDOSCOPIC VALVE WITH ADAPTER.: Brand: SURSEAL® II

## (undated) DEVICE — STERILE SURGICAL LUBRICANT,  TUBE: Brand: SURGILUBE

## (undated) DEVICE — SCD SEQUENTIAL COMPRESSION COMFORT SLEEVE MEDIUM KNEE LENGTH: Brand: KENDALL SCD

## (undated) DEVICE — URO CATCHER BAG STERILE 0-UC32

## (undated) DEVICE — BASKET SPECIMEN RETRIVAL 1.9FR 120CM

## (undated) DEVICE — PACK TUR

## (undated) DEVICE — MAT FLOOR STEP DRI 24 X 36 IN N-STRL